# Patient Record
Sex: MALE | Race: WHITE | NOT HISPANIC OR LATINO | Employment: OTHER | URBAN - METROPOLITAN AREA
[De-identification: names, ages, dates, MRNs, and addresses within clinical notes are randomized per-mention and may not be internally consistent; named-entity substitution may affect disease eponyms.]

---

## 2017-01-01 ENCOUNTER — OFFICE VISIT (OUTPATIENT)
Dept: NEPHROLOGY | Facility: CLINIC | Age: 82
End: 2017-01-01
Payer: MEDICARE

## 2017-01-01 ENCOUNTER — TELEPHONE (OUTPATIENT)
Dept: NEPHROLOGY | Facility: CLINIC | Age: 82
End: 2017-01-01

## 2017-01-01 VITALS
WEIGHT: 163.38 LBS | BODY MASS INDEX: 23.39 KG/M2 | HEIGHT: 70 IN | DIASTOLIC BLOOD PRESSURE: 62 MMHG | SYSTOLIC BLOOD PRESSURE: 108 MMHG | OXYGEN SATURATION: 98 % | HEART RATE: 66 BPM

## 2017-01-01 DIAGNOSIS — N17.9 ACUTE KIDNEY FAILURE, UNSPECIFIED: Primary | ICD-10-CM

## 2017-01-01 DIAGNOSIS — N18.30 CHRONIC KIDNEY DISEASE, STAGE III (MODERATE): ICD-10-CM

## 2017-01-01 DIAGNOSIS — N17.9 ACUTE RENAL FAILURE, UNSPECIFIED ACUTE RENAL FAILURE TYPE: Primary | ICD-10-CM

## 2017-01-01 PROCEDURE — 99999 PR PBB SHADOW E&M-EST. PATIENT-LVL III: CPT | Mod: PBBFAC,,, | Performed by: INTERNAL MEDICINE

## 2017-01-01 PROCEDURE — 3008F BODY MASS INDEX DOCD: CPT | Mod: S$GLB,,, | Performed by: INTERNAL MEDICINE

## 2017-01-01 PROCEDURE — 1159F MED LIST DOCD IN RCRD: CPT | Mod: S$GLB,,, | Performed by: INTERNAL MEDICINE

## 2017-01-01 PROCEDURE — 1125F AMNT PAIN NOTED PAIN PRSNT: CPT | Mod: S$GLB,,, | Performed by: INTERNAL MEDICINE

## 2017-01-01 PROCEDURE — 99204 OFFICE O/P NEW MOD 45 MIN: CPT | Mod: S$GLB,,, | Performed by: INTERNAL MEDICINE

## 2017-01-01 RX ORDER — CEPHALEXIN 250 MG/1
250 CAPSULE ORAL EVERY 8 HOURS
Qty: 21 CAPSULE | Refills: 0 | Status: ON HOLD | OUTPATIENT
Start: 2017-01-01 | End: 2017-01-01 | Stop reason: HOSPADM

## 2017-09-19 PROBLEM — F02.80 ALZHEIMER'S DEMENTIA WITHOUT BEHAVIORAL DISTURBANCE: Status: ACTIVE | Noted: 2017-01-01

## 2017-09-19 PROBLEM — G30.9 ALZHEIMER'S DEMENTIA WITHOUT BEHAVIORAL DISTURBANCE: Status: ACTIVE | Noted: 2017-01-01

## 2017-09-19 PROBLEM — E11.9 CONTROLLED TYPE 2 DIABETES MELLITUS WITHOUT COMPLICATION: Status: ACTIVE | Noted: 2017-01-01

## 2017-09-25 NOTE — LETTER
September 25, 2017      Romie Tylre MD  [address]  [facility] [address]           [facility]  [address] [facility] [address] [place] [address]  Phone: [phone]          Patient: Stan Finley   MR Number: [id number]   YOB: 1933   Date of Visit: 9/25/2017       Dear Dr. Romie Tyler:    Thank you for referring Stan Finley to me for evaluation. Attached you will find relevant portions of my assessment and plan of care.    If you have questions, please do not hesitate to call me. I look forward to following Stan Finley along with you.    Sincerely,    Herman Espana MD    Enclosure  CC:  No Recipients    If you would like to receive this communication electronically, please contact [email or web address] or [phone] to request more information on [facility] access.    For providers and/or their staff who would like to refer a patient to [facility], please contact us through our one-stop-shop provider referral line, [facility], at [phone].    If you feel you have received this communication in error or would no longer like to receive these types of communications, please e-mail [email or web address]

## 2017-09-25 NOTE — PROGRESS NOTES
Subjective:       Patient ID: Stan Finley is a 84 y.o. White male who presents for new patient evaluation for chronic renal failure.    Stan Finley is referred by Romie Tyler MD to be evaluated for chronic renal failure.  He recently had an episode of shingles a month ago and was placed on remeron, tramadol and hydrocodone.  He recently was taken off of lasix and tramadol after losing his appetite and eating/drinking less over the same time period.  He has seen Dr. Meraz in the past for urinary incontinence and UTIs.  His creatinine recently was seen to go from 1.3 to 2.3.  He had a urine done three days prior to his recent labs and has evidence of a UTI but did not take any antibiotics.  His kidneys were 10.2 cm B on ultrasound.  He reportedly has lost 20 pounds in the past month.  His blood pressure has been lower than usual since his weight loss occurred.      Review of Systems   Constitutional: Positive for appetite change. Negative for chills and fever.   Eyes: Negative for visual disturbance.   Respiratory: Negative for cough and shortness of breath.    Cardiovascular: Negative for chest pain and leg swelling.   Gastrointestinal: Negative for diarrhea, nausea and vomiting.   Genitourinary: Negative for difficulty urinating, dysuria and hematuria.   Musculoskeletal: Positive for gait problem. Negative for myalgias.   Skin: Negative for rash.   Neurological: Positive for tremors and weakness. Negative for headaches.   Psychiatric/Behavioral: Positive for decreased concentration. Negative for sleep disturbance.       The past medical, family and social histories were reviewed for this encounter.     Past Medical History:   Diagnosis Date    Abnormal blood find NEC     abnormal CBC    Adv eff med/biol NEC/NOS     Dementia in Alzheimer's disease     Edema     Embolism and thrombosis of unspecified site     Memory loss     Orchitis and epididymitis, unspecified     Other and unspecified  "hyperlipidemia     Personal history of colonic polyps     Retention of urine, unspecified     Type II or unspecified type diabetes mellitus without mention of complication, not stated as uncontrolled     Ulcerative colitis, unspecified     Unspecified hypothyroidism      Past Surgical History:   Procedure Laterality Date    CATARACT EXTRACTION Left 2010    CATARACT EXTRACTION Right 2010    cyst removed from back of neck       beginning of Feb.     Social History     Social History    Marital status:      Spouse name: N/A    Number of children: N/A    Years of education: N/A     Occupational History    Not on file.     Social History Main Topics    Smoking status: Never Smoker    Smokeless tobacco: Former User    Alcohol use No    Drug use: No    Sexual activity: Not on file     Other Topics Concern    Not on file     Social History Narrative    No narrative on file     Current Outpatient Prescriptions   Medication Sig    azathioprine (IMURAN) 50 mg Tab Take by mouth. 3 tablets once  Daily-per wife    finasteride (PROSCAR) 5 mg tablet Take 5 mg by mouth once daily.    lisinopril 10 MG tablet TAKE 1 TABLET (10MG TOTAL) ONE TIME DAILY    predniSONE (DELTASONE) 2.5 MG tablet Take 2.5 mg by mouth once daily.    tamsulosin (FLOMAX) 0.4 mg Cp24 Take 1 capsule by mouth once daily.    triamcinolone acetonide 0.1% (KENALOG) 0.1 % cream as needed.     VITAMIN D3 1,000 unit capsule Take 1,000 Units by mouth once daily.     No current facility-administered medications for this visit.        /62 (BP Location: Right arm, Patient Position: Sitting)   Pulse 66   Ht 5' 10" (1.778 m)   Wt 74.1 kg (163 lb 5.8 oz)   SpO2 98%   BMI 23.44 kg/m²     Objective:      Physical Exam   Constitutional: He appears well-developed and well-nourished. No distress.   HENT:   Head: Normocephalic and atraumatic.   Eyes: Conjunctivae are normal.   Neck: Neck supple. No JVD present.   Cardiovascular: Normal " rate, regular rhythm and normal heart sounds.  Exam reveals no gallop and no friction rub.    No murmur heard.  Pulmonary/Chest: Effort normal and breath sounds normal. No respiratory distress. He has no wheezes. He has no rales.   Abdominal: Soft. Bowel sounds are normal. He exhibits no distension. There is no tenderness.   Musculoskeletal: He exhibits no edema.   Neurological: He is alert.   Tremor noted in arms bilaterally   Skin: Skin is warm and dry. No rash noted.   Psychiatric: He has a normal mood and affect.   Vitals reviewed.      Assessment:       1. Acute renal failure, unspecified acute renal failure type    2. Chronic kidney disease, stage III (moderate)        Plan:   Return to clinic in 3 months.  Labs for next visit include rp.  Baseline creatinine is 1.3-1.4.  D/C lisinopril due to low blood pressure and worsened renal function.  Stop lasix and remeron.  I will call tomorrow with the recent [facility] labs.  I am concerned that he has worsened renal function given that he looks to have some degree of asterixis which could be due to his renal failure or from some of the medications he is takiing.

## 2017-09-26 NOTE — TELEPHONE ENCOUNTER
Please see Dr. Tyler note.  After wife called, I spoke with Dr. Espana who will review his chart this afternoon and send out another RX later this evening.  Mrs. Claudio informed.

## 2017-09-27 NOTE — TELEPHONE ENCOUNTER
Shanon states she is waiting to hear from test results.       Shingles better, Patient has been sleeping all day.  Drinks water and goes to the bathroom to urinate and goes back to bed.  She can't get him to eat anything, and has only drank 2-3 ounces today.  She said he did want some coffee last night so she gave him some coffee with milk.

## 2017-09-27 NOTE — TELEPHONE ENCOUNTER
----- Message from Chidirobert Jarod sent at 9/27/2017  2:40 PM CDT -----  Contact: Wife - Shanon Palma  States that the patient hasn't eaten anything all day and the only thing he had was an ice cream sandwich yesterday.  He is drinking ice water.  The shingles are getting better.  Can you please call Shanon [phone].  Thank you

## 2017-09-28 NOTE — TELEPHONE ENCOUNTER
----- Message from Akosua Pandey sent at 9/28/2017  2:57 PM CDT -----  Please call patient in regards to results [phone] (tcpj)

## 2017-09-28 NOTE — TELEPHONE ENCOUNTER
Spoke with patient who said she was waiting to hear from Dr. Espana and wanted to know if patient should be on medicine.  I reminded her of our conversation yesterday about the keflex Dr. Espana sent in at 0538 in the morning (because we talked about how early that was.)  She said [facility] hasn't called her yet.  I told her to call to see if it's ready and get him started on medicine.      Call transferred to DR. Espana for further discussion.

## 2017-09-28 NOTE — TELEPHONE ENCOUNTER
----- Message from Akosua Pandey sent at 9/28/2017  2:57 PM CDT -----  Please call patient in regards to results [phone] ([phone])

## 2017-09-28 NOTE — TELEPHONE ENCOUNTER
I spoke to his wife.    She states that Mr Malia is having issues again with his mental status as he is prone to do at times with waxing and waning of his mentation.    He has no fever but has a poor appetite and was drinking little up until yesterday.  He is making urine.  He is still having pain at the site he has shingles and was taking opiates and tramadol for this.  She does not think he was sneaking NSAIDs.    She does not wish to bring him to ER which was my first choice.    She will bring him to clinic tomorrow at 10 am and will check in at the lab first to get labs drawn stat so we can reassess him.    Do you mind swinging by a urine specimen cup?

## 2017-09-29 PROBLEM — J18.9 PNEUMONIA OF LEFT LOWER LOBE DUE TO INFECTIOUS ORGANISM: Status: ACTIVE | Noted: 2017-01-01

## 2017-09-29 NOTE — TELEPHONE ENCOUNTER
----- Message from Andree Khoury sent at 9/29/2017  8:28 AM CDT -----  Contact: Shanon (spouse)  Shanon (spouse) calling to speak with the Nurse about the patient's appt today. Please advise. Call to pod. No answer.  Call back  [phone]  Thanks!

## 2017-09-29 NOTE — TELEPHONE ENCOUNTER
Mrs. Claudio reports that she and her son are there and they are having trouble getting him up he is in so much pain.  He doesn't want them to touch him.      She wanted to know if she should still bring him or if they should bring him to hospital.  I advised Mrs. Claudio that he definitely needed to be assessed somewhere, be it here or the hospital and it might be better equipped to do so at the hospital, which was Dr. Espana first choice.    She said they will bring him to hospital when they are able to get him up.

## 2017-09-30 PROBLEM — I82.403 ACUTE DEEP VEIN THROMBOSIS (DVT) OF BOTH LOWER EXTREMITIES: Status: ACTIVE | Noted: 2017-01-01
